# Patient Record
Sex: FEMALE | Race: WHITE
[De-identification: names, ages, dates, MRNs, and addresses within clinical notes are randomized per-mention and may not be internally consistent; named-entity substitution may affect disease eponyms.]

---

## 2019-09-22 ENCOUNTER — HOSPITAL ENCOUNTER (EMERGENCY)
Dept: HOSPITAL 11 - JP.ED | Age: 69
Discharge: HOME | End: 2019-09-22
Payer: COMMERCIAL

## 2019-09-22 VITALS — SYSTOLIC BLOOD PRESSURE: 124 MMHG | DIASTOLIC BLOOD PRESSURE: 39 MMHG | HEART RATE: 75 BPM

## 2019-09-22 DIAGNOSIS — Z88.1: ICD-10-CM

## 2019-09-22 DIAGNOSIS — Z91.041: ICD-10-CM
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
## 2019-09-22 DIAGNOSIS — Z88.8: ICD-10-CM

## 2019-09-22 DIAGNOSIS — S22.31XA: Primary | ICD-10-CM

## 2019-09-22 DIAGNOSIS — Z79.899: ICD-10-CM

## 2019-09-22 PROCEDURE — 96372 THER/PROPH/DIAG INJ SC/IM: CPT

## 2019-09-22 PROCEDURE — 99284 EMERGENCY DEPT VISIT MOD MDM: CPT

## 2019-09-22 PROCEDURE — 71101 X-RAY EXAM UNILAT RIBS/CHEST: CPT

## 2019-09-22 NOTE — EDM.PDOC
ED HPI GENERAL MEDICAL PROBLEM





- General


Chief Complaint: Flank Pain


Stated Complaint: RT SIDE RIB PAIN


Time Seen by Provider: 09/22/19 13:17


Source of Information: Reports: Patient


History Limitations: Reports: No Limitations





- History of Present Illness


INITIAL COMMENTS - FREE TEXT/NARRATIVE: 





pt was vacuming a car and twisted into a position and she developed acute pain 

in her rt rib cage area. 


Onset: Other (yesterday. the pt was vacuuing a car out and heard something pop 

in her rib cage. )


Duration: Hour(s):


Location: Reports: Chest, Other (pt has pain in her rt ant chest. )


Associated Symptoms: Reports: Chest Pain, Other (pain when she moves and deep 

breathes. )


  ** Right


Pain Score (Numeric/FACES): 6





- Related Data


 Allergies











Allergy/AdvReac Type Severity Reaction Status Date / Time


 


Iodinated Contrast Media Allergy  Anaphylactic Verified 09/22/19 13:18





[Iodinated Contrast Media -   Shock  





IV Dye]     


 


pentazocine lactate Allergy  Rash Verified 09/22/19 13:18





[From Talwin]     


 


Tetracyclines Allergy  Difficulty Verified 09/22/19 13:18





   Breathing  











Home Meds: 


 Home Meds





FLUoxetine HCl [Prozac] 40 mg PO BID 04/09/16 [History]


Vitamin B Complex [Complex B-100] 1 tab PO DAILY 04/09/16 [History]


Loratadine [Claritin] 10 mg PO DAILY 09/22/19 [History]


Multivit with Calcium,Iron,Min [Essential Daily] 1 tab PO DAILY 09/22/19 [

History]











Past Medical History


OB/GYN History: Reports: Pregnancy


Musculoskeletal History: Reports: Back Pain, Chronic


Neurological History: Reports: Migraines





- Past Surgical History


GI Surgical History: Reports: Appendectomy, Bariatric Procedure


Female  Surgical History: Reports: Breast Biopsy, Hysterectomy


Musculoskeletal Surgical History: Reports: Carpal Tunnel





ED ROS GENERAL





- Review of Systems


Review Of Systems: See Below


Constitutional: Reports: No Symptoms


HEENT: Reports: No Symptoms


Respiratory: Reports: Pleuritic Chest Pain


Cardiovascular: Reports: No Symptoms


Endocrine: Reports: No Symptoms


GI/Abdominal: Reports: No Symptoms


: Reports: No Symptoms


Musculoskeletal: Reports: Other (pain in rt ant rib cage. )


Skin: Reports: No Symptoms





ED EXAM,LOWER BACK PAIN/INJURY





- Physical Exam


Exam: See Below


Text/Narrative:: 





pt arrived with pain in her ant rt rib cage at the level of the 6th or 7th rib. 


Exam Limited By: No Limitations


General Appearance: Alert, Anxious, Moderate Distress


Ears: Normal TMs


Nose: Normal Inspection


Throat/Mouth: Normal Inspection


Head: Atraumatic


Neck: Normal Inspection


Respiratory/Chest: Other (pt has pain with deep breathing. She is tender to 

palpate the mid ant chest on the rt. )


Cardiovascular: Regular Rate, Rhythm


GI/Abdominal: Soft, Non-Tender


 (Female) Exam: Deferred


Rectal (Female) Exam: Deferred


Back Exam: Normal Inspection


Extremities: Normal Inspection


Neurological: Alert, Oriented x 3





Course





- Vital Signs


Last Recorded V/S: 


 Last Vital Signs











Temp  35.6 C   09/22/19 13:16


 


Pulse  75   09/22/19 13:16


 


Resp  13   09/22/19 13:16


 


BP  124/39 L  09/22/19 13:16


 


Pulse Ox  95   09/22/19 13:16














- Orders/Labs/Meds


Orders: 


 Active Orders 24 hr











 Category Date Time Status


 


 Ribs 2V w Chest Rt [CR] Stat Exams  09/22/19 13:33 Taken











Meds: 


Medications














Discontinued Medications














Generic Name Dose Route Start Last Admin





  Trade Name Freq  PRN Reason Stop Dose Admin


 


Hydrocodone Bitart/Acetaminophen  1 tab  09/22/19 13:35  09/22/19 14:00





  [place] 325-5 Mg  PO  09/22/19 13:36  1 tab





  ONETIME ONE   Administration





     





     





     





     


 


Ketorolac Tromethamine  60 mg  09/22/19 13:35  





  Toradol  IM  09/22/19 13:36  





  ONETIME ONE   





     





     





     





     














- Re-Assessments/Exams


Free Text/Narrative Re-Assessment/Exam: 





09/22/19 13:55


chest xray and rib detail was obtained which shows a possible hairline fracture 

of the 6th rib-- no displacement. 





Departure





- Departure


Time of Disposition: 13:56


Disposition: Home, Self-Care 01


Condition: Fair


Clinical Impression: 


 Rib fracture








- Discharge Information


Referrals: 


Rayna Morales MD [Primary Care Provider] - 


Forms:  ED Department Discharge


Care Plan Goals: 


cool pack to area, motrin 600mg tid, norco 5/325 q6h prn for pain--severe,#10 

encourage deep breathing  avoid heavy lifting  or pulling. 





- My Orders


Last 24 Hours: 


My Active Orders





09/22/19 13:33


Ribs 2V w Chest Rt [CR] Stat 














- Assessment/Plan


Last 24 Hours: 


My Active Orders





09/22/19 13:33


Ribs 2V w Chest Rt [CR] Stat

## 2019-09-22 NOTE — CRLCR
Indication:



Pain in right anterior ribs.



Technique:



A PA view of the chest was obtained. Two views of the right ribs were 

obtained.



Comparison:



None



Findings:



Heart is normal in size. The lungs are clear. No infiltrate, pleural 

effusion, or pneumothorax is identified. No displaced right rib fractures 

are identified.



Impression:



No displaced right rib fractures.



Dictated by Maeve Castano MD @ Sep 22 2019  2:05PM



Signed by Dr. Maeve Castano @ Sep 22 2019  2:06PM

## 2020-02-14 ENCOUNTER — HOSPITAL ENCOUNTER (EMERGENCY)
Dept: HOSPITAL 11 - JP.ED | Age: 70
Discharge: HOME | End: 2020-02-14
Payer: MEDICARE

## 2020-02-14 VITALS — SYSTOLIC BLOOD PRESSURE: 113 MMHG | DIASTOLIC BLOOD PRESSURE: 56 MMHG | HEART RATE: 71 BPM

## 2020-02-14 DIAGNOSIS — Z88.8: ICD-10-CM

## 2020-02-14 DIAGNOSIS — F41.9: ICD-10-CM

## 2020-02-14 DIAGNOSIS — Z91.041: ICD-10-CM

## 2020-02-14 DIAGNOSIS — Z88.1: ICD-10-CM

## 2020-02-14 DIAGNOSIS — F17.210: ICD-10-CM

## 2020-02-14 DIAGNOSIS — G43.909: Primary | ICD-10-CM

## 2020-02-14 DIAGNOSIS — Z79.899: ICD-10-CM

## 2020-02-14 PROCEDURE — 96372 THER/PROPH/DIAG INJ SC/IM: CPT

## 2020-02-14 PROCEDURE — 99283 EMERGENCY DEPT VISIT LOW MDM: CPT

## 2020-02-14 PROCEDURE — 99284 EMERGENCY DEPT VISIT MOD MDM: CPT

## 2020-02-14 NOTE — EDM.PDOC
ED HPI GENERAL MEDICAL PROBLEM





- General


Chief Complaint: Headache


Stated Complaint: MIGRAINE


Time Seen by Provider: 02/14/20 18:45


Source of Information: Reports: Patient


History Limitations: Reports: No Limitations





- History of Present Illness


INITIAL COMMENTS - FREE TEXT/NARRATIVE: 





69-year-old female who has chronic back neck and headache problems since an 

accident years ago is scheduled for ablation of cervical nerves in 2 weeks but 

today saw some scotoma-like lights and then developed a frontal headache.  She 

is taking her prescribed medications but it is not breaking the headache.  She 

received a Toradol injection last week at the clinic which provided her minimal 

if any relief.  She was told to come to the emergency room for "something 

stronger".  Mild nausea but no vomiting, no neurologic deficits.


Onset: Unknown/Unsure


Duration: Week(s): (Off and on for weeks)


Location: Reports: Head (Frontal, bilateral)


Associated Symptoms: Reports: Malaise, Other (Nausea but no vomiting)


  ** Headache


Pain Score (Numeric/FACES): 9





- Related Data


 Allergies











Allergy/AdvReac Type Severity Reaction Status Date / Time


 


Iodinated Contrast Media Allergy  Anaphylactic Verified 02/14/20 18:31





[Iodinated Contrast Media -   Shock  





IV Dye]     


 


pentazocine lactate Allergy  Rash Verified 02/14/20 18:31





[From Talwin]     


 


Tetracyclines Allergy  Difficulty Verified 02/14/20 18:31





   Breathing  











Home Meds: 


 Home Meds





FLUoxetine HCl [Prozac] 40 mg PO BID 04/09/16 [History]


Vitamin B Complex [Complex B-100] 1 tab PO DAILY 04/09/16 [History]


Loratadine [Claritin] 10 mg PO DAILY 09/22/19 [History]


Multivit with Calcium,Iron,Min [Essential Daily] 1 tab PO DAILY 09/22/19 [

History]


Cholecalciferol (Vitamin D3) [Vitamin D3] 4,000 unit PO BEDTIME 02/14/20 [

History]


methocarbamoL [Robaxin] 500 mg PO TID PRN 02/14/20 [History]


oxyCODONE HCl/Acetaminophen [Percocet 5-325 mg Tablet] 1 tab PO Q4H PRN 02/14/ 20 [History]


tiZANidine [Zanaflex] 2 mg PO BID PRN 02/14/20 [History]











Past Medical History


HEENT History: Reports: Impaired Vision


Gastrointestinal History: Reports: None


Genitourinary History: Reports: None


OB/GYN History: Reports: Pregnancy


Musculoskeletal History: Reports: Back Pain, Chronic


Neurological History: Reports: Migraines


Psychiatric History: Reports: Anxiety, Dementia, PTSD


Hematologic History: Reports: B12 Deficiency, Blood Transfusion(s)





- Infectious Disease History


Infectious Disease History: Reports: Chicken Pox, Measles, Mumps





- Past Surgical History


Head Surgeries/Procedures: Reports: None


HEENT Surgical History: Reports: Tonsillectomy


GI Surgical History: Reports: Appendectomy, Bariatric Procedure


Female  Surgical History: Reports: Breast Biopsy, Hysterectomy


Musculoskeletal Surgical History: Reports: Carpal Tunnel





Social & Family History





- Tobacco Use


Smoking Status *Q: Current Every Day Smoker


Years of Tobacco use: 40


Packs/Tins Daily: 0.5





- Caffeine Use


Caffeine Use: Reports: Coffee





- Recreational Drug Use


Recreational Drug Use: No





ED ROS GENERAL





- Review of Systems


Review Of Systems: See Below


Constitutional: Reports: Malaise.  Denies: Fever, Chills


Respiratory: Denies: No Symptoms, Shortness of Breath


Cardiovascular: Denies: Chest Pain


GI/Abdominal: Reports: Nausea.  Denies: Vomiting


Skin: Reports: No Symptoms


Neurological: Reports: Headache.  Denies: Dizziness, Paresthesia


Psychiatric: Reports: No Symptoms





- Physical Exam


Exam: See Below


Exam Limited By: No Limitations


General Appearance: Alert, Mild Distress (Looks uncomfortable, photophobic)


Eye Exam: Bilateral Eye: Normal Inspection


Head Exam: Atraumatic


Respiratory/Chest: No Respiratory Distress


Neuro Exam (Abbreviated): Alert, Oriented, No Motor/Sensory Deficits


Skin Exam: Warm, Dry





Course





- Vital Signs


Last Recorded V/S: 


 Last Vital Signs











Temp  98.9 F   02/14/20 18:35


 


Pulse  71   02/14/20 18:35


 


Resp  17   02/14/20 18:35


 


BP  113/56 L  02/14/20 18:35


 


Pulse Ox  94 L  02/14/20 18:35














- Orders/Labs/Meds


Meds: 


Medications














Discontinued Medications














Generic Name Dose Route Start Last Admin





  Trade Name Freq  PRN Reason Stop Dose Admin


 


Hydromorphone HCl  1 mg  02/14/20 18:52  02/14/20 18:59





  Dilaudid  IM  02/14/20 18:53  1 mg





  ONETIME ONE   Administration





     





     





     





     


 


Methylprednisolone Sodium Succinate  125 mg  02/14/20 18:52  02/14/20 18:59





  Solu-Medrol  IM  02/14/20 18:53  125 mg





  ONETIME ONE   Administration





     





     





     





     














- Re-Assessments/Exams


Free Text/Narrative Re-Assessment/Exam: 





02/14/20 18:55


After discussing options, patient will be given 1 injection of Solu-Medrol 125 

mg IM along with 1 mg of IM Dilaudid.  She can rest tonight and recheck in the 

next few days if not improving satisfactorily, while continuing her regular 

medications.





Departure





- Departure


Time of Disposition: 19:07


Disposition: Home, Self-Care 01


Clinical Impression: 


 Migraine








- Discharge Information


Instructions:  Migraine Headache, Easy-to-Read


Referrals: 


Lorrie George MD [Primary Care Provider] - 


Forms:  ED Department Discharge


Care Plan Goals: 


Rest tonight, continue your regular medications and consider rechecking in the 

next 2 to 3 days if not improving satisfactorily.





Sepsis Event Note





- Evaluation


Sepsis Screening Result: No Definite Risk





- Focused Exam


Vital Signs: 


 Vital Signs











  Temp Pulse Resp BP Pulse Ox


 


 02/14/20 18:35  98.9 F  71  17  113/56 L  94 L


 


 02/14/20 18:14  98.9 F  71  17  113/56 L  94 L











Date Exam was Performed: 02/14/20


Time Exam was Performed: 19:53

## 2020-05-19 ENCOUNTER — HOSPITAL ENCOUNTER (OUTPATIENT)
Dept: HOSPITAL 11 - JP.SDS | Age: 70
Discharge: HOME | End: 2020-05-19
Attending: SURGERY
Payer: MEDICARE

## 2020-05-19 VITALS — DIASTOLIC BLOOD PRESSURE: 56 MMHG | SYSTOLIC BLOOD PRESSURE: 113 MMHG | HEART RATE: 65 BPM

## 2020-05-19 DIAGNOSIS — Z12.11: Primary | ICD-10-CM

## 2020-05-19 DIAGNOSIS — Z98.84: ICD-10-CM

## 2020-05-19 DIAGNOSIS — Z87.19: ICD-10-CM

## 2020-05-19 DIAGNOSIS — K64.4: ICD-10-CM

## 2020-05-19 NOTE — OR
DATE OF PROCEDURE:  05/19/2020

 

SURGEON:  Kermit Petersen MD

 

PROCEDURE:  Colonoscopy.

 

FINDINGS:  Normal colonoscopy.

 

PREOPERATIVE DIAGNOSIS:  Screening colonoscopy.

 

POSTOPERATIVE DIAGNOSIS:  Screening colonoscopy.

 

RISKS:  Risks, benefits, alternatives, and limitations including, but not limited to,

infection, bleeding, and perforation were explained to the patient, and they wished to

proceed.

 

PROCEDURE IN DETAIL:  The patient was placed in left lateral decubitus position.  Digital

rectal exam was performed, which showed mild external hemorrhoids.

 

The scope was introduced and advanced atraumatically to the ileocecal valve.

 

The scope was brought back through the ascending, transverse, descending colon, and

retroflexed.

 

No evidence of old or new blood.  No masses.  No polyps.  No diverticulosis.  No

abnormalities on retroflex.  The patient tolerated the procedure well.

 

 

 

 

Kermit Petersen MD

DD:  05/19/2020 08:15:02

DT:  05/19/2020 09:22:44

Job #:  [id number]

## 2021-01-08 ENCOUNTER — HOSPITAL ENCOUNTER (EMERGENCY)
Dept: HOSPITAL 11 - JP.ED | Age: 71
Discharge: HOME | End: 2021-01-08
Payer: MEDICARE

## 2021-01-08 VITALS — DIASTOLIC BLOOD PRESSURE: 39 MMHG | HEART RATE: 66 BPM | SYSTOLIC BLOOD PRESSURE: 105 MMHG

## 2021-01-08 DIAGNOSIS — S12.401A: Primary | ICD-10-CM

## 2021-01-08 DIAGNOSIS — Z91.013: ICD-10-CM
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
## 2021-01-08 DIAGNOSIS — F17.210: ICD-10-CM

## 2021-01-08 DIAGNOSIS — Z79.899: ICD-10-CM

## 2021-01-08 DIAGNOSIS — S12.501A: ICD-10-CM

## 2021-01-08 DIAGNOSIS — Z91.041: ICD-10-CM

## 2021-01-08 DIAGNOSIS — Z91.040: ICD-10-CM

## 2021-01-08 DIAGNOSIS — Z91.048: ICD-10-CM

## 2021-01-08 DIAGNOSIS — Z88.1: ICD-10-CM

## 2021-01-08 DIAGNOSIS — Z88.8: ICD-10-CM

## 2021-01-08 DIAGNOSIS — S50.812A: ICD-10-CM

## 2021-01-08 NOTE — EDM.PDOC
<Kenton Sepulveda - Last Filed: 01/08/21 17:26>





ED HPI GENERAL MEDICAL PROBLEM





- General


Chief Complaint: Lower Extremity Injury/Pain


Stated Complaint: HIT HEAD


Time Seen by Provider: 01/08/21 17:25


Source of Information: Reports: Patient


History Limitations: Reports: No Limitations





- History of Present Illness


INITIAL COMMENTS - FREE TEXT/NARRATIVE: 





70-year-old female was on a ladder when she stumbled and fell backwards about 4 

feet when the ladder collapsed underneath her.  She landed on a concrete floor 

on top of the ladder near her shoulders and neck and head.  She hit her head 

fairly hard on the floor but did not lose consciousness.  She has significant 

head, neck, upper back, left knee pain and also an abrasion on her left forearm.

 She is struggling with persistent dizziness but no visual disturbance.  Her 

friend who was with her said as hard as she fell she wanted her checked out.  

She has had neck surgery in the past but has no metal in her neck.  She has no 

peripheral paresthesias or weakness, no nausea or vomiting.  She just "hurts all

over".  The fall was 3 hours ago.  She is able to bear weight on the left leg 

but has to "hobble".


Onset: Sudden


Duration: Hour(s): (3 hours ago)


Location: Reports: Head, Neck, Back, Lower Extremity, Left


Quality: Reports: Ache, Stabbing


Worsens with: Reports: Other (Weightbearing on the left leg increases her knee 

pain), Movement


Associated Symptoms: Reports: Headaches, Other (Dizziness)


  ** Generalized


Pain Score (Numeric/FACES): 9





- Related Data


                                    Allergies











Allergy/AdvReac Type Severity Reaction Status Date / Time


 


adhesive tape Allergy  Rash Verified 01/08/21 17:07


 


Iodinated Contrast Media Allergy  Anaphylactic Verified 01/08/21 17:07





[Iodinated Contrast Media -   Shock  





IV Dye]     


 


pentazocine Allergy  Rash Verified 01/08/21 17:07


 


pentazocine lactate Allergy  Rash Verified 01/08/21 17:07





[From Talwin]     


 


shellfish derived Allergy  Cannot Verified 01/08/21 17:07





   Remember  


 


Tetracyclines Allergy  Difficulty Verified 01/08/21 17:07





   Breathing  


 


triple Dye Allergy  Anaphylactic Uncoded 05/19/20 06:52





   Shock  











Home Meds: 


                                    Home Meds





FLUoxetine HCl [Prozac] 40 mg PO BID 04/09/16 [History]


Vitamin B Complex [Complex B-100] 1 tab PO DAILY 04/09/16 [History]


Loratadine [Claritin] 10 mg PO DAILY 09/22/19 [History]


Multivit with Calcium,Iron,Min [Essential Daily] 1 tab PO DAILY 09/22/19 

[History]


tiZANidine [Zanaflex] 4 mg PO Q6H PRN 02/14/20 [History]


Cyanocobalamin (Vitamin B-12) [Vitamin B-12] 5,000 mcg PO DAILY 05/18/20 

[History]


Ergocalciferol (Vitamin D2) [Ergocalciferol] 5,000 mcg PO BEDTIME 05/18/20 

[History]


Fluticasone Propionate [Flovent] 2 spray ARCHIE DAILY 05/18/20 [History]


methocarbamoL [Robaxin] 750 mg PO BID PRN 05/19/20 [History]


methylPREDNISolone [Methylprednisolone] 4 mg PO ASDIRECTED 01/08/21 [History]











Past Medical History


HEENT History: Reports: Allergic Rhinitis, Impaired Vision


Respiratory History: Reports: Sleep Apnea


Gastrointestinal History: Reports: None


Genitourinary History: Reports: Neurogenic Bladder


OB/GYN History: Reports: Pregnancy


Musculoskeletal History: Reports: Back Pain, Chronic


Neurological History: Reports: Migraines


Psychiatric History: Reports: Anxiety, Depression, PTSD


Hematologic History: Reports: B12 Deficiency, Blood Transfusion(s)





- Infectious Disease History


Infectious Disease History: Reports: Chicken Pox, Measles, Mumps





- Past Surgical History


Head Surgeries/Procedures: Reports: None


HEENT Surgical History: Reports: Oral Surgery, Tonsillectomy


GI Surgical History: Reports: Appendectomy, Bariatric Procedure, Colonoscopy, 

EGD, Esophageal Dilatation


Female  Surgical History: Reports: Breast Biopsy, Hysterectomy


Neurological Surgical History: Reports: C-Spine, Laminectomy


Musculoskeletal Surgical History: Reports: Carpal Tunnel





Social & Family History





- Tobacco Use


Tobacco Use Status *Q: Heavy Tobacco User


Years of Tobacco use: 40


Packs/Tins Daily: 1





- Caffeine Use


Caffeine Use: Reports: Coffee





- Recreational Drug Use


Recreational Drug Use: No





Review of Systems





- Review of Systems


Review Of Systems: See Below


Constitutional: Denies: Fever


Eyes: Denies: Vision Change


Ears: Reports: Dizziness


Mouth/Throat: Reports: Other (Hit her lips on the shelf when she started falling

 but there is no objective swelling or trauma)


Respiratory: Reports: Pleuritic Chest Pain (Some pain with deep breath in the 

back).  Denies: Shortness of Breath


Cardiovascular: Denies: Chest Pain


GI/Abdominal: Denies: Abdominal Pain, Nausea, Vomiting


Musculoskeletal: Reports: Neck Pain, Leg Pain (Left knee)


Skin: Reports: Bruising (Small abrasion on the left elbow, no other bruising or 

abrasions)


Neurological: Reports: Dizziness, Headache, Difficulty Walking (Due to left knee

 pain)


Psychiatric: Reports: No Symptoms





ED EXAM, GENERAL





- Physical Exam


Exam: See Below


Exam Limited By: No Limitations


General Appearance: Alert, No Apparent Distress (Looks uncomfortable but not 

distressed)


Eye Exam: Bilateral Eye: EOMI, PERRL


Throat/Mouth: Other (No acute traumatic findings of the dental exam, no mucosal 

injury seen of the upper or lower lip.  Mandible is nontender)


Head: Other (She is tender to palpation over the occiput of the scalp but there 

is no significant hematoma or abrasions seen)


Neck: Other (Neck is somewhat difficult to examine because any palpation 

bilaterally or percussion of the neck and upper spine causes her to wince and 

experience discomfort)


Respiratory/Chest: No Respiratory Distress, Lungs Clear


Cardiovascular: Regular Rate, Rhythm


GI/Abdominal: Soft, Non-Tender


Extremities: Other (Tender to palpation over the medial aspect of the left knee 

and patella area, with slight swelling but no bruising or abrasion, no erythema.

  Increased pain with valgus stress to the knee)


Neurological: Alert, Oriented, No Motor/Sensory Deficits


Psychiatric: Normal Affect, Normal Mood


Skin Exam: Warm, Dry, Other (Only objective skin finding is a small abrasion on 

the left forearm extensor surface)





Course





- Re-Assessments/Exams


Free Text/Narrative Re-Assessment/Exam: 





01/08/21 17:32


CT of the head and neck was ordered without contrast, along with a left knee x-

ray.  I do not suspect any significant findings despite the patient's symptoms. 

 Care will be turned over to Dr. Bruno pending results of the CT scans.





Departure





- Departure


Disposition: Home, Self-Care 01


Clinical Impression: 


C5 cervical fracture


Qualifiers:


 Encounter type: initial encounter Fracture type: closed Fracture morphology: 

unspecified fracture morphology Fracture alignment: nondisplaced Qualified 

Code(s): S12.401A - Unspecified nondisplaced fracture of fifth cervical 

vertebra, initial encounter for closed fracture





C6 cervical fracture


Qualifiers:


 Encounter type: initial encounter Fracture type: closed Fracture morphology: 

unspecified fracture morphology Fracture alignment: nondisplaced Qualified 

Code(s): S12.501A - Unspecified nondisplaced fracture of sixth cervical 

vertebra, initial encounter for closed fracture





Clinical Impression: 


 (Ruled Out): Closed cervical spine fracture





- Discharge Information


Instructions:  Cervical Spine Fracture, Stable


Referrals: 


Lorrie George MD [Primary Care Provider] - 


Forms:  ED Department Discharge


Additional Instructions: 


Wear your soft cervical collar for support/protection. Take either acetaminophen

OR Norco for pain relief. Recheck with your provider early next week to see how 

you are doing, see if you need more pain medication, and see if a referral will 

be required to see a specialist. 





Sepsis Event Note (ED)





- Evaluation


Sepsis Screening Result: No Definite Risk





<Fabian Bruno - Last Filed: 01/08/21 20:02>





Course





- Vital Signs


Last Recorded V/S: 


                                Last Vital Signs











Temp  36.3 C   01/08/21 17:06


 


Pulse  66   01/08/21 17:06


 


Resp  16   01/08/21 17:06


 


BP  105/39 L  01/08/21 17:06


 


Pulse Ox  96   01/08/21 17:06














- Orders/Labs/Meds


Orders: 


                               Active Orders 24 hr











 Category Date Time Status


 


 Knee 3V Lt [CR] Stat Exams  01/08/21 17:33 Taken











Meds: 


Medications














Discontinued Medications














Generic Name Dose Route Start Last Admin





  Trade Name Elian  PRN Reason Stop Dose Admin


 


Acetaminophen  1,000 mg  01/08/21 18:45  01/08/21 18:53





  Tylenol Extra Strength  PO  01/08/21 18:46  1,000 mg





  ONETIME ONE   Administration


 


Acetaminophen  Confirm  01/08/21 18:55 





  Tylenol Extra Strength  Administered  01/08/21 18:56 





  Dose  





  500 mg  





  .ROUTE  





  .STK-MED ONE  


 


Ketorolac Tromethamine  30 mg  01/08/21 18:46  01/08/21 18:53





  Toradol  IM  01/08/21 18:47  30 mg





  ONETIME ONE   Administration














- Radiology Interpretation


Free Text/Narrative:: 





Knee X-ray- neg





C spine CT-C5 spinous process fx, C6 superior articulating facet non-displaced 

fx, C5 right lamina fx





Head CT-neg


CT Results Date: 01/08/21


CT Results Time: 19:55





Departure





- Departure


Time of Disposition: 20:15


Condition: Fair





- Discharge Information


*PRESCRIPTION DRUG MONITORING PROGRAM REVIEWED*: Not Applicable


*COPY OF PRESCRIPTION DRUG MONITORING REPORT IN PATIENT VELVET: Not Applicable





Sepsis Event Note (ED)





- Focused Exam


Vital Signs: 


                                   Vital Signs











  Temp Pulse Resp BP Pulse Ox


 


 01/08/21 17:06  36.3 C  66  16  105/39 L  96


 


 01/08/21 17:01  36.3 C  66  16  105/39 L  96

## 2021-01-08 NOTE — CRLCT
INDICATION:



Fall. Head injury. Dizziness



TECHNIQUE:



CT head without contrast.



COMPARISON:



05/24/2016 



FINDINGS:



There is age-related cortical atrophy. The ventricles are within normal 

limits for the patient`s age. There is no mass effect or midline shift. 

There is no loss of gray-white differentiation. There is no evidence of an 

acute extra-axial hemorrhage. Few apparent punctate parenchymal densities 

are at least partially related to artifact.



No acute calvarial fracture is seen. There is mild superior scalp soft 

tissue swelling at the vertex. 



The visualized paranasal sinuses and mastoid air cells are clear. The 

visualized orbits are within normal limits. 



IMPRESSION:



No evidence of an acute extra-axial hemorrhage, mass effect or loss of 

gray-white differentiation. Few apparent punctate parenchymal densities are 

at least partially related to artifact. If there is clinical concern, a 

short-term follow-up study can be obtained.



Dictated by Anthony Holley MD @ 1/8/2021 6:59:42 PM



Please note that all CT scans at this facility use dose modulation, 

iterative reconstruction, and/or weight-based dosing when appropriate to 

reduce radiation dose to as low as reasonably achievable.



Dictated by: Anthony Holley MD @ 01/08/2021 18:59:57



(Electronically Signed)

## 2021-01-08 NOTE — CRLCT
INDICATION:



Fall



TECHNIQUE:



CT cervical spine without contrast.



COMPARISON:



An MRI dated 01/25/2017



FINDINGS:



There is straightening of the cervical lordosis. Slight anterolisthesis of 

C3 on C4 is probably degenerative. Again noted is near complete fusion of 

the C5 and C6 vertebral bodies. The craniocervical and atlantoaxial 

alignments are near anatomical. There is a nondisplaced fracture involving 

the right C6 superior articulating facet, traversing the partially fused 

right C5-6 facet joint into the right C5 lamina and spinous process. There 

is a small lucency and focal cortical irregularity at the left anterior 

aspect of the C6 superior endplate region on image 64 of series 3. An 

apparent thin vertical lucency in the left C7 inferior articulating facet 

on image 40 of series 9 is not seen on adjacent images or the axial and 

coronal reformats, presumably artifactual. There is no significant 

precervical soft tissue swelling. Degenerative changes are seen. 



IMPRESSION:



A nondisplaced fracture involving the right C6 superior articulating facet, 

right C5 lamina and C5 spinous process. A small subcortical lucency at the 

left anterior C6 superior endplate could represent a small nondisplaced 

fracture.



Dictated by Atnhony Holley MD @ 1/8/2021 7:43:46 PM



Please note that all CT scans at this facility use dose modulation, 

iterative reconstruction, and/or weight-based dosing when appropriate to 

reduce radiation dose to as low as reasonably achievable.



Dictated by: Anthony Holley MD @ 01/08/2021 19:45:26



(Electronically Signed)

## 2021-01-11 NOTE — CR
Knee 3V Lt

 

CLINICAL HISTORY: Pain

 

FINDINGS: No acute fracture or dislocation is noted. There are no osseous

lesions. There is mild joint space narrowing in the medial compartment. There is

mild patellar spurring. Fullness in the suprapatellar bursa suggests joint

effusion.

 

Impression: Mild osteoarthritic change

 

Joint effusion

## 2022-10-11 ENCOUNTER — HOSPITAL ENCOUNTER (OUTPATIENT)
Dept: HOSPITAL 11 - JP.SDS | Age: 72
End: 2022-10-11
Attending: FAMILY MEDICINE
Payer: MEDICARE

## 2022-10-11 DIAGNOSIS — Z20.822: ICD-10-CM

## 2022-10-11 DIAGNOSIS — F17.210: ICD-10-CM

## 2022-10-11 DIAGNOSIS — K29.70: ICD-10-CM

## 2022-10-11 DIAGNOSIS — G47.33: ICD-10-CM

## 2022-10-11 DIAGNOSIS — D50.9: Primary | ICD-10-CM

## 2022-10-11 DIAGNOSIS — Z98.84: ICD-10-CM

## 2022-10-11 DIAGNOSIS — K21.9: ICD-10-CM

## 2022-10-11 PROCEDURE — 43239 EGD BIOPSY SINGLE/MULTIPLE: CPT

## 2022-10-11 PROCEDURE — 87081 CULTURE SCREEN ONLY: CPT

## 2022-10-11 PROCEDURE — 45378 DIAGNOSTIC COLONOSCOPY: CPT
